# Patient Record
Sex: FEMALE | Race: WHITE | HISPANIC OR LATINO | Employment: UNEMPLOYED | URBAN - METROPOLITAN AREA
[De-identification: names, ages, dates, MRNs, and addresses within clinical notes are randomized per-mention and may not be internally consistent; named-entity substitution may affect disease eponyms.]

---
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
## 2021-11-29 ENCOUNTER — HOSPITAL ENCOUNTER (EMERGENCY)
Facility: HOSPITAL | Age: 21
Discharge: HOME | End: 2021-11-29
Attending: EMERGENCY MEDICINE

## 2021-11-29 VITALS
HEART RATE: 96 BPM | RESPIRATION RATE: 18 BRPM | OXYGEN SATURATION: 100 % | DIASTOLIC BLOOD PRESSURE: 71 MMHG | TEMPERATURE: 98 F | SYSTOLIC BLOOD PRESSURE: 107 MMHG

## 2021-11-29 DIAGNOSIS — R11.2 NON-INTRACTABLE VOMITING WITH NAUSEA, UNSPECIFIED VOMITING TYPE: Primary | ICD-10-CM

## 2021-11-29 DIAGNOSIS — R10.84 GENERALIZED ABDOMINAL PAIN: ICD-10-CM

## 2021-11-29 LAB
ALBUMIN SERPL BCP-MCNC: 4.5 G/DL (ref 3.5–5.2)
ALP SERPL-CCNC: 78 U/L (ref 55–135)
ALT SERPL W/O P-5'-P-CCNC: 31 U/L (ref 10–44)
ANION GAP SERPL CALC-SCNC: 12 MMOL/L (ref 8–16)
AST SERPL-CCNC: 24 U/L (ref 10–40)
B-HCG UR QL: NEGATIVE
BACTERIA #/AREA URNS HPF: ABNORMAL /HPF
BASOPHILS # BLD AUTO: 0.03 K/UL (ref 0–0.2)
BASOPHILS NFR BLD: 0.5 % (ref 0–1.9)
BILIRUB SERPL-MCNC: 0.3 MG/DL (ref 0.1–1)
BILIRUB UR QL STRIP: NEGATIVE
BUN SERPL-MCNC: 11 MG/DL (ref 6–20)
CALCIUM SERPL-MCNC: 9.2 MG/DL (ref 8.7–10.5)
CHLORIDE SERPL-SCNC: 104 MMOL/L (ref 95–110)
CLARITY UR: ABNORMAL
CO2 SERPL-SCNC: 24 MMOL/L (ref 23–29)
COLOR UR: ABNORMAL
CREAT SERPL-MCNC: 0.7 MG/DL (ref 0.5–1.4)
CTP QC/QA: YES
DIFFERENTIAL METHOD: ABNORMAL
EOSINOPHIL # BLD AUTO: 0 K/UL (ref 0–0.5)
EOSINOPHIL NFR BLD: 0.3 % (ref 0–8)
ERYTHROCYTE [DISTWIDTH] IN BLOOD BY AUTOMATED COUNT: 14.5 % (ref 11.5–14.5)
EST. GFR  (AFRICAN AMERICAN): >60 ML/MIN/1.73 M^2
EST. GFR  (NON AFRICAN AMERICAN): >60 ML/MIN/1.73 M^2
GLUCOSE SERPL-MCNC: 96 MG/DL (ref 70–110)
GLUCOSE UR QL STRIP: NEGATIVE
HCT VFR BLD AUTO: 40.3 % (ref 37–48.5)
HGB BLD-MCNC: 13.3 G/DL (ref 12–16)
HGB UR QL STRIP: ABNORMAL
HYALINE CASTS #/AREA URNS LPF: 0 /LPF
IMM GRANULOCYTES # BLD AUTO: 0.02 K/UL (ref 0–0.04)
IMM GRANULOCYTES NFR BLD AUTO: 0.3 % (ref 0–0.5)
KETONES UR QL STRIP: ABNORMAL
LEUKOCYTE ESTERASE UR QL STRIP: ABNORMAL
LIPASE SERPL-CCNC: 24 U/L (ref 4–60)
LYMPHOCYTES # BLD AUTO: 1.5 K/UL (ref 1–4.8)
LYMPHOCYTES NFR BLD: 24.5 % (ref 18–48)
MAGNESIUM SERPL-MCNC: 2 MG/DL (ref 1.6–2.6)
MCH RBC QN AUTO: 26 PG (ref 27–31)
MCHC RBC AUTO-ENTMCNC: 33 G/DL (ref 32–36)
MCV RBC AUTO: 79 FL (ref 82–98)
MICROSCOPIC COMMENT: ABNORMAL
MONOCYTES # BLD AUTO: 0.4 K/UL (ref 0.3–1)
MONOCYTES NFR BLD: 6.8 % (ref 4–15)
NEUTROPHILS # BLD AUTO: 4.1 K/UL (ref 1.8–7.7)
NEUTROPHILS NFR BLD: 67.6 % (ref 38–73)
NITRITE UR QL STRIP: NEGATIVE
NRBC BLD-RTO: 0 /100 WBC
PH UR STRIP: 7 [PH] (ref 5–8)
PLATELET # BLD AUTO: 272 K/UL (ref 150–450)
PMV BLD AUTO: 11.8 FL (ref 9.2–12.9)
POTASSIUM SERPL-SCNC: 3.9 MMOL/L (ref 3.5–5.1)
PROT SERPL-MCNC: 8 G/DL (ref 6–8.4)
PROT UR QL STRIP: ABNORMAL
RBC # BLD AUTO: 5.11 M/UL (ref 4–5.4)
RBC #/AREA URNS HPF: >100 /HPF (ref 0–4)
SODIUM SERPL-SCNC: 140 MMOL/L (ref 136–145)
SP GR UR STRIP: 1.02 (ref 1–1.03)
SQUAMOUS #/AREA URNS HPF: 3 /HPF
URN SPEC COLLECT METH UR: ABNORMAL
UROBILINOGEN UR STRIP-ACNC: NEGATIVE EU/DL
WBC # BLD AUTO: 6.05 K/UL (ref 3.9–12.7)
WBC #/AREA URNS HPF: 8 /HPF (ref 0–5)

## 2021-11-29 PROCEDURE — 81000 URINALYSIS NONAUTO W/SCOPE: CPT | Performed by: EMERGENCY MEDICINE

## 2021-11-29 PROCEDURE — 63600175 PHARM REV CODE 636 W HCPCS: Performed by: EMERGENCY MEDICINE

## 2021-11-29 PROCEDURE — 81025 URINE PREGNANCY TEST: CPT | Performed by: EMERGENCY MEDICINE

## 2021-11-29 PROCEDURE — 83735 ASSAY OF MAGNESIUM: CPT | Performed by: EMERGENCY MEDICINE

## 2021-11-29 PROCEDURE — 25000003 PHARM REV CODE 250: Performed by: EMERGENCY MEDICINE

## 2021-11-29 PROCEDURE — 96374 THER/PROPH/DIAG INJ IV PUSH: CPT

## 2021-11-29 PROCEDURE — 99285 EMERGENCY DEPT VISIT HI MDM: CPT | Mod: 25

## 2021-11-29 PROCEDURE — 80053 COMPREHEN METABOLIC PANEL: CPT | Performed by: EMERGENCY MEDICINE

## 2021-11-29 PROCEDURE — 96375 TX/PRO/DX INJ NEW DRUG ADDON: CPT

## 2021-11-29 PROCEDURE — 83690 ASSAY OF LIPASE: CPT | Performed by: EMERGENCY MEDICINE

## 2021-11-29 PROCEDURE — 96361 HYDRATE IV INFUSION ADD-ON: CPT

## 2021-11-29 PROCEDURE — 85025 COMPLETE CBC W/AUTO DIFF WBC: CPT | Performed by: EMERGENCY MEDICINE

## 2021-11-29 RX ORDER — METOCLOPRAMIDE 10 MG/1
10 TABLET ORAL EVERY 6 HOURS PRN
Qty: 14 TABLET | Refills: 0 | OUTPATIENT
Start: 2021-11-29 | End: 2023-10-02

## 2021-11-29 RX ORDER — DICYCLOMINE HYDROCHLORIDE 20 MG/1
20 TABLET ORAL 3 TIMES DAILY PRN
Qty: 14 TABLET | Refills: 0 | Status: SHIPPED | OUTPATIENT
Start: 2021-11-29 | End: 2021-12-29

## 2021-11-29 RX ORDER — KETOROLAC TROMETHAMINE 30 MG/ML
15 INJECTION, SOLUTION INTRAMUSCULAR; INTRAVENOUS
Status: COMPLETED | OUTPATIENT
Start: 2021-11-29 | End: 2021-11-29

## 2021-11-29 RX ORDER — METOCLOPRAMIDE HYDROCHLORIDE 5 MG/ML
10 INJECTION INTRAMUSCULAR; INTRAVENOUS
Status: COMPLETED | OUTPATIENT
Start: 2021-11-29 | End: 2021-11-29

## 2021-11-29 RX ADMIN — SODIUM CHLORIDE 1000 ML: 0.9 INJECTION, SOLUTION INTRAVENOUS at 11:11

## 2021-11-29 RX ADMIN — METOCLOPRAMIDE 10 MG: 5 INJECTION, SOLUTION INTRAMUSCULAR; INTRAVENOUS at 11:11

## 2021-11-29 RX ADMIN — LIDOCAINE HYDROCHLORIDE: 20 SOLUTION ORAL; TOPICAL at 12:11

## 2021-11-29 RX ADMIN — KETOROLAC TROMETHAMINE 15 MG: 30 INJECTION, SOLUTION INTRAMUSCULAR; INTRAVENOUS at 12:11

## 2023-02-15 ENCOUNTER — TELEPHONE (OUTPATIENT)
Dept: OBSTETRICS AND GYNECOLOGY | Facility: CLINIC | Age: 23
End: 2023-02-15
Payer: MEDICAID

## 2023-02-15 NOTE — TELEPHONE ENCOUNTER
----- Message from Yazan Morgan sent at 2/14/2023  1:10 PM CST -----  Contact: Pt.  .Type:  Needs Medical Advice    Who Called: pt  Would the patient rather a call back or a response via [facility]?  Call back  Best Call Back Number: [phone]  Additional Information:  Pt. Last menstrual was 11/21/2023 went to the women clinic was told she pregnant.  Pt would like to see Dr. Watson next appt was not until April.  Did not know if that was to farther out.   Please call the pt. back

## 2023-04-26 ENCOUNTER — TELEPHONE (OUTPATIENT)
Dept: OBSTETRICS AND GYNECOLOGY | Facility: CLINIC | Age: 23
End: 2023-04-26
Payer: MEDICAID

## 2023-04-26 NOTE — TELEPHONE ENCOUNTER
----- Message from Denise Benavides sent at 4/26/2023 10:34 AM CDT -----  Type:  pregnancy appointment     Who Called: pt  Symptoms (please be specific): needs call back to schedule appointment for pregnancy     Would the patient rather a call back or a response via [facility]? call  Best Call Back Number: [phone]  Additional Information:

## 2023-04-26 NOTE — TELEPHONE ENCOUNTER
----- Message from Yazan Morgan sent at 4/26/2023  2:34 PM CDT -----  Contact: pt  .Type:  Needs Medical Advice    Who Called: pt  Would the patient rather a call back or a response via [facility]?  Call back  Best Call Back Number: [phone]  Additional Information: pt. Is returning a call back to the office.

## 2023-10-02 ENCOUNTER — HOSPITAL ENCOUNTER (EMERGENCY)
Facility: HOSPITAL | Age: 23
Discharge: HOME | End: 2023-10-02
Attending: EMERGENCY MEDICINE
Payer: OTHER GOVERNMENT

## 2023-10-02 VITALS
HEART RATE: 80 BPM | WEIGHT: 135 LBS | OXYGEN SATURATION: 100 % | TEMPERATURE: 98 F | SYSTOLIC BLOOD PRESSURE: 132 MMHG | RESPIRATION RATE: 16 BRPM | DIASTOLIC BLOOD PRESSURE: 69 MMHG | BODY MASS INDEX: 26.37 KG/M2

## 2023-10-02 DIAGNOSIS — R10.84 GENERALIZED ABDOMINAL PAIN: ICD-10-CM

## 2023-10-02 DIAGNOSIS — K29.00 ACUTE SUPERFICIAL GASTRITIS WITHOUT HEMORRHAGE: ICD-10-CM

## 2023-10-02 DIAGNOSIS — R11.2 NAUSEA AND VOMITING, UNSPECIFIED VOMITING TYPE: Primary | ICD-10-CM

## 2023-10-02 LAB
ALBUMIN SERPL BCP-MCNC: 4.3 G/DL (ref 3.5–5.2)
ALP SERPL-CCNC: 84 U/L (ref 55–135)
ALT SERPL W/O P-5'-P-CCNC: 26 U/L (ref 10–44)
ANION GAP SERPL CALC-SCNC: 13 MMOL/L (ref 8–16)
AST SERPL-CCNC: 28 U/L (ref 10–40)
B-HCG UR QL: NEGATIVE
BACTERIA #/AREA URNS HPF: ABNORMAL /HPF
BASOPHILS # BLD AUTO: 0.03 K/UL (ref 0–0.2)
BASOPHILS NFR BLD: 0.3 % (ref 0–1.9)
BILIRUB SERPL-MCNC: 0.2 MG/DL (ref 0.1–1)
BILIRUB UR QL STRIP: NEGATIVE
BUN SERPL-MCNC: 17 MG/DL (ref 6–20)
CALCIUM SERPL-MCNC: 9.4 MG/DL (ref 8.7–10.5)
CHLORIDE SERPL-SCNC: 105 MMOL/L (ref 95–110)
CLARITY UR: CLEAR
CO2 SERPL-SCNC: 22 MMOL/L (ref 23–29)
COLOR UR: YELLOW
CREAT SERPL-MCNC: 0.8 MG/DL (ref 0.5–1.4)
CTP QC/QA: YES
DIFFERENTIAL METHOD: ABNORMAL
EOSINOPHIL # BLD AUTO: 0.1 K/UL (ref 0–0.5)
EOSINOPHIL NFR BLD: 1.4 % (ref 0–8)
ERYTHROCYTE [DISTWIDTH] IN BLOOD BY AUTOMATED COUNT: 14.4 % (ref 11.5–14.5)
EST. GFR  (NO RACE VARIABLE): >60 ML/MIN/1.73 M^2
GLUCOSE SERPL-MCNC: 89 MG/DL (ref 70–110)
GLUCOSE UR QL STRIP: NEGATIVE
HCT VFR BLD AUTO: 37.5 % (ref 37–48.5)
HGB BLD-MCNC: 12.1 G/DL (ref 12–16)
HGB UR QL STRIP: ABNORMAL
IMM GRANULOCYTES # BLD AUTO: 0.03 K/UL (ref 0–0.04)
IMM GRANULOCYTES NFR BLD AUTO: 0.3 % (ref 0–0.5)
KETONES UR QL STRIP: NEGATIVE
LEUKOCYTE ESTERASE UR QL STRIP: NEGATIVE
LIPASE SERPL-CCNC: 52 U/L (ref 4–60)
LYMPHOCYTES # BLD AUTO: 2.2 K/UL (ref 1–4.8)
LYMPHOCYTES NFR BLD: 21.1 % (ref 18–48)
MCH RBC QN AUTO: 26.5 PG (ref 27–31)
MCHC RBC AUTO-ENTMCNC: 32.3 G/DL (ref 32–36)
MCV RBC AUTO: 82 FL (ref 82–98)
MICROSCOPIC COMMENT: ABNORMAL
MONOCYTES # BLD AUTO: 0.5 K/UL (ref 0.3–1)
MONOCYTES NFR BLD: 5.1 % (ref 4–15)
NEUTROPHILS # BLD AUTO: 7.4 K/UL (ref 1.8–7.7)
NEUTROPHILS NFR BLD: 71.8 % (ref 38–73)
NITRITE UR QL STRIP: NEGATIVE
NRBC BLD-RTO: 0 /100 WBC
PH UR STRIP: 7 [PH] (ref 5–8)
PLATELET # BLD AUTO: 237 K/UL (ref 150–450)
PMV BLD AUTO: 12.7 FL (ref 9.2–12.9)
POTASSIUM SERPL-SCNC: 4.4 MMOL/L (ref 3.5–5.1)
PROT SERPL-MCNC: 8.1 G/DL (ref 6–8.4)
PROT UR QL STRIP: NEGATIVE
RBC # BLD AUTO: 4.56 M/UL (ref 4–5.4)
RBC #/AREA URNS HPF: 21 /HPF (ref 0–4)
SODIUM SERPL-SCNC: 140 MMOL/L (ref 136–145)
SP GR UR STRIP: 1.02 (ref 1–1.03)
SQUAMOUS #/AREA URNS HPF: 4 /HPF
URN SPEC COLLECT METH UR: ABNORMAL
UROBILINOGEN UR STRIP-ACNC: NEGATIVE EU/DL
WBC # BLD AUTO: 10.36 K/UL (ref 3.9–12.7)
WBC #/AREA URNS HPF: 3 /HPF (ref 0–5)

## 2023-10-02 PROCEDURE — 80053 COMPREHEN METABOLIC PANEL: CPT | Performed by: EMERGENCY MEDICINE

## 2023-10-02 PROCEDURE — 81000 URINALYSIS NONAUTO W/SCOPE: CPT | Performed by: EMERGENCY MEDICINE

## 2023-10-02 PROCEDURE — 96374 THER/PROPH/DIAG INJ IV PUSH: CPT

## 2023-10-02 PROCEDURE — 85025 COMPLETE CBC W/AUTO DIFF WBC: CPT | Performed by: EMERGENCY MEDICINE

## 2023-10-02 PROCEDURE — 96361 HYDRATE IV INFUSION ADD-ON: CPT

## 2023-10-02 PROCEDURE — 25000003 PHARM REV CODE 250: Performed by: EMERGENCY MEDICINE

## 2023-10-02 PROCEDURE — 83690 ASSAY OF LIPASE: CPT | Performed by: EMERGENCY MEDICINE

## 2023-10-02 PROCEDURE — 81025 URINE PREGNANCY TEST: CPT | Performed by: EMERGENCY MEDICINE

## 2023-10-02 PROCEDURE — 63600175 PHARM REV CODE 636 W HCPCS: Performed by: EMERGENCY MEDICINE

## 2023-10-02 PROCEDURE — 99284 EMERGENCY DEPT VISIT MOD MDM: CPT | Mod: 25

## 2023-10-02 RX ORDER — DICYCLOMINE HYDROCHLORIDE 10 MG/1
20 CAPSULE ORAL
Status: COMPLETED | OUTPATIENT
Start: 2023-10-02 | End: 2023-10-02

## 2023-10-02 RX ORDER — MAG HYDROX/ALUMINUM HYD/SIMETH 200-200-20
5 SUSPENSION, ORAL (FINAL DOSE FORM) ORAL
Status: COMPLETED | OUTPATIENT
Start: 2023-10-02 | End: 2023-10-02

## 2023-10-02 RX ORDER — ONDANSETRON 2 MG/ML
4 INJECTION INTRAMUSCULAR; INTRAVENOUS
Status: COMPLETED | OUTPATIENT
Start: 2023-10-02 | End: 2023-10-02

## 2023-10-02 RX ORDER — ONDANSETRON 4 MG/1
4 TABLET, FILM COATED ORAL EVERY 6 HOURS
Qty: 12 TABLET | Refills: 0 | Status: SHIPPED | OUTPATIENT
Start: 2023-10-02 | End: 2023-10-05

## 2023-10-02 RX ORDER — OMEPRAZOLE 20 MG/1
40 CAPSULE, DELAYED RELEASE ORAL DAILY
Qty: 30 CAPSULE | Refills: 1 | Status: SHIPPED | OUTPATIENT
Start: 2023-10-02 | End: 2023-12-21 | Stop reason: SDUPTHER

## 2023-10-02 RX ADMIN — SODIUM CHLORIDE 1000 ML: 9 INJECTION, SOLUTION INTRAVENOUS at 10:10

## 2023-10-02 RX ADMIN — DICYCLOMINE HYDROCHLORIDE 20 MG: 10 CAPSULE ORAL at 10:10

## 2023-10-02 RX ADMIN — ALUMINUM HYDROXIDE, MAGNESIUM HYDROXIDE, AND SIMETHICONE 5 ML: 200; 200; 20 SUSPENSION ORAL at 10:10

## 2023-10-02 RX ADMIN — ONDANSETRON 4 MG: 2 INJECTION INTRAMUSCULAR; INTRAVENOUS at 10:10

## 2023-10-03 NOTE — DISCHARGE INSTRUCTIONS
Thank you for choosing [facility]!     Our goal in the Emergency Department is to always provide outstanding medical care. You may receive a survey by mail or e-mail in the next week regarding your experience today. We would greatly appreciate you completing and returning the survey. Your feedback provides us with a way to recognize our staff who provide very good care, and it helps us learn how to improve when your experience was below our aspiration of excellence.      It is important to remember that some problems are difficult to diagnose and may not be found during your first visit. Be sure to follow up with your primary care doctor and review any labs/imaging that was performed during your visit with them. If you do not have a primary care doctor, you may contact the one listed on your discharge paperwork, or you may also call the [facility] Appointment Desk at [phone] to schedule an appointment.     All medications may potentially have side effects and it is impossible to predict which medications may give you side effects. If you feel that you are having a negative effect of any medication you should immediately stop taking them and seek medical attention.  Do not drive or make any important decisions for 24 hours if you have received any pain medications, sedatives or mood altering drugs during your ER visit.    We appreciate you trusting us with your medical care. We will be happy to take care of you for all of your future medical needs. You may return to the ER at any time for any new/concerning symptoms, worsening condition, or failure to improve. We hope you feel better soon.     Sincerely,    Boston Brown Jr., MD  Board-Certified Emergency Medicine Physician  [facility]

## 2023-10-03 NOTE — ED TRIAGE NOTES
Pt to ED 25 with c/o abdominal pain, emesis and nausea. Pt states she became ill before arrival after eating mexican food. Pt states she had multiple bouts of emesis. Upon arrival pt states she feels better. DEANA. AAOX4. Vbitals WNL. Pt resting on stretcher; family at bedside. Respirations even and non labored. Skin warm, dry, and intact.

## 2023-10-03 NOTE — ED PROVIDER NOTES
Emergency Department Encounter  Provider Note    Erendira Silva  [id number]  10/2/2023    Evaluation:    History Acquisition:     Chief Complaint   Patient presents with    Abdominal Pain     Patient states she has severe upper abd pain that started just prior to arrival after eating some mexican food. She had an episode of vomiting pta. She denies dizziness, diarrhea. She took aleve 3 hours ago for her period cramps.     Vomiting       History of Present Illness:  Erendira Silva is a 22 y.o. female who has no past medical history on file.    The patient presents to the ED due to epigastric abdominal pain, N/V.   Patient reports symptoms started after eating tortillas with beans.  She denies any history of similar symptoms. No prior abdominal surgery.   She states her symptoms resolved prior to arrival and currently has no pain. No other concerns.        #[id number] used for professional medical interpretation.       Additional historians utilized:  none    Prior medical records were reviewed:   [facility] admission for pregnancy 8/14  2. ED visit 11/2021 for abdominal pain, N/V    The patient's list of active medical problems, social history, medications, and allergies as documented has been reviewed.     No past medical history on file.  No past surgical history on file.  No family history on file.  Social History     Socioeconomic History    Marital status: Single   Tobacco Use    Smoking status: Never   Substance and Sexual Activity    Alcohol use: No     Review of patient's allergies indicates:  No Known Allergies    Review of Systems   Gastrointestinal:  Positive for abdominal pain, nausea and vomiting.         Physical Exam:     Initial Vitals [10/02/23 2137]   BP Pulse Resp Temp SpO2   125/71 82 20 98.6 °F (37 °C) 98 %      MAP       --         Physical Exam    Nursing note and vitals reviewed.  Constitutional: She appears well-developed and well-nourished. She is not diaphoretic. No distress.   HENT:    Head: Normocephalic and atraumatic.   Mouth/Throat: Oropharynx is clear and moist.   Eyes: EOM are normal. Pupils are equal, round, and reactive to light.   Neck: No tracheal deviation present.   Cardiovascular:  Normal rate, regular rhythm, normal heart sounds and intact distal pulses.           Pulmonary/Chest: Breath sounds normal. No stridor. No respiratory distress. She has no wheezes.   Abdominal: Abdomen is soft. Bowel sounds are normal. She exhibits no distension and no mass. There is no abdominal tenderness.   Musculoskeletal:         General: No edema. Normal range of motion.     Neurological: She is alert and oriented to person, place, and time. She has normal strength. No cranial nerve deficit or sensory deficit.   Skin: Skin is warm and dry. Capillary refill takes less than 2 seconds. No pallor.   Psychiatric: She has a normal mood and affect. Her behavior is normal. Thought content normal.         ED Course:   Procedures  Medical Decision Making:    Differential Diagnoses:   Based on available information and initial assessment, Differential Diagnosis includes, but is not limited to:  AAA, aortic dissection, mesenteric ischemia, perforated viscous, MI/ACS, SBO/volvulus, incarcerated/strangulated hernia, intussusception, ileus, appendicitis, cholecystitis, cholangitis, diverticulitis, esophagitis, hepatitis, nephrolithiasis, pancreatitis, gastroenteritis, colitis, IBD/IBS, biliary colic, GERD, PUD, constipation, UTI/pyelonephritis,  disorder.        EKG:       Labs:     Labs Reviewed   URINALYSIS, REFLEX TO URINE CULTURE - Abnormal; Notable for the following components:       Result Value    Occult Blood UA 2+ (*)     All other components within normal limits    Narrative:     Specimen Source->Urine   COMPREHENSIVE METABOLIC PANEL - Abnormal; Notable for the following components:    CO2 22 (*)     All other components within normal limits   URINALYSIS MICROSCOPIC - Abnormal; Notable for the  following components:    RBC, UA 21 (*)     All other components within normal limits    Narrative:     Specimen Source->Urine   CBC W/ AUTO DIFFERENTIAL - Abnormal; Notable for the following components:    MCH 26.5 (*)     All other components within normal limits   LIPASE   POCT URINE PREGNANCY     Independent review of the labs ordered include:   See ED course    Imaging:     Imaging Results    None            Medications Given:     Medications   sodium chloride 0.9% bolus 1,000 mL 1,000 mL (1,000 mLs Intravenous New Bag 10/2/23 2226)   ondansetron injection 4 mg (4 mg Intravenous Given 10/2/23 2226)   aluminum-magnesium hydroxide-simethicone 200-200-20 mg/5 mL suspension 5 mL (5 mLs Oral Given 10/2/23 2225)   dicyclomine capsule 20 mg (20 mg Oral Given 10/2/23 2226)        Additional Consideration:   Additional testing considered during clinical course: none    Social determinants of health considered during development of treatment plan include: poor access to care, language barrier    Current co-morbidities considered which impacted clinical decision making: inflammatory bowel disease    Case discussed with additional provider: none    ED Course as of 10/02/23 2324   Mon Oct 02, 2023   2318 SpO2: 98 % [SS]   2318 Resp: 20 [SS]   2318 Pulse: 82 [SS]   2318 Temp Source: Oral [SS]   2318 Temp: 98.6 °F (37 °C) [SS]   2318 BP: 125/71  Vitals WNL [SS]   2318 POCT urine pregnancy  Negative  [SS]   2318 Lipase  WNL [SS]   2318 CBC auto differential(!)  Unremarkable  [SS]   2318 Comprehensive metabolic panel(!)  Unremarkable  [SS]   2318 Urinalysis Microscopic(!) [SS]   2318 Urinalysis, Reflex to Urine Culture Urine, Clean Catch(!)  Inconsistent with UTI [SS]      ED Course User Index  [SS] Boston Brown MD            Medical Decision Making  21 yo F with epigastric pain after eating. Resolved prior to arrival.  Vitals and exam benign. Labs unremarkable.   Will DC with supportive care.     After complete evaluation,  including thorough history and physical exam, the patient's symptoms are most consistent with benign cause of abdominal pain, likely mild gastritis. There is no rebound/guarding or other peritoneal signs to suggest perforation or other emergent surgical process. There is no fever or leukocytosis to suggest acute bacterial infection. There is no significant focal abdominal tenderness to suggest cholecystitis, appendicitis, diverticulitis, or  source, and the patient's current symptoms and clinical presentation do not warrant other targeted diagnostics at this time. CT A/P is unlikely to outweigh risks of contrast/radiation at this time. The patient was treated with supportive care and improved. Will provide RX for omeprazole, Zofran upon D/C.      Problems Addressed:  Acute superficial gastritis without hemorrhage: acute illness or injury  Nausea and vomiting, unspecified vomiting type: acute illness or injury    Amount and/or Complexity of Data Reviewed  External Data Reviewed: notes.  Labs: ordered. Decision-making details documented in ED Course.    Risk  OTC drugs.  Prescription drug management.        Clinical Impression:       ICD-10-CM ICD-9-CM   1. Nausea and vomiting, unspecified vomiting type  R11.2 787.01   2. Acute superficial gastritis without hemorrhage  K29.00 535.40   3. Generalized abdominal pain  R10.84 789.07       Discharge Medications:  Current Discharge Medication List            Follow-up Information       Follow up With Specialties Details Why Contact Info    Jeff Hyde MD Family Medicine   [address] [facility]  [address]  [place] [address] [phone]               ED Disposition Condition    Discharge Stable              On re-evaluation, the patient's status has improved.  After complete ED evaluation, clinical impression is most consistent with gastritis.  PCP follow-up as soon as possible was recommended.    After taking into careful account the patient's history, physical exam  findings, as well as empirical and objective data obtained throughout ED workup, I feel no emergent medical condition has been identified. No further evaluation or admission was felt to be required, and the patient is stable for discharge from the ED. The patient and any additional family present were updated with test results, overall clinical impression, and recommended further plan of care, including discharge instructions as provided and outpatient follow-up for continued evaluation and management as needed. All questions were answered. The patient expressed understanding and agreed with current plan for discharge and follow-up plan of care. Strict ED return precautions were provided, including return/worsening of current symptoms, new symptoms, or any other concerns.       Boston Brown MD  10/02/23 0570

## 2023-12-21 ENCOUNTER — OFFICE VISIT (OUTPATIENT)
Dept: FAMILY MEDICINE | Facility: HOSPITAL | Age: 23
End: 2023-12-21
Payer: OTHER GOVERNMENT

## 2023-12-21 VITALS
DIASTOLIC BLOOD PRESSURE: 73 MMHG | WEIGHT: 127.63 LBS | HEART RATE: 72 BPM | BODY MASS INDEX: 25.06 KG/M2 | HEIGHT: 60 IN | SYSTOLIC BLOOD PRESSURE: 104 MMHG

## 2023-12-21 DIAGNOSIS — R53.83 FATIGUE, UNSPECIFIED TYPE: ICD-10-CM

## 2023-12-21 DIAGNOSIS — Z11.4 SCREENING FOR HIV (HUMAN IMMUNODEFICIENCY VIRUS): ICD-10-CM

## 2023-12-21 DIAGNOSIS — K21.9 GASTROESOPHAGEAL REFLUX DISEASE, UNSPECIFIED WHETHER ESOPHAGITIS PRESENT: ICD-10-CM

## 2023-12-21 DIAGNOSIS — Z13.6 ENCOUNTER FOR SCREENING FOR CARDIOVASCULAR DISORDERS: ICD-10-CM

## 2023-12-21 DIAGNOSIS — Z11.59 ENCOUNTER FOR HEPATITIS C SCREENING TEST FOR LOW RISK PATIENT: ICD-10-CM

## 2023-12-21 DIAGNOSIS — R10.84 GENERALIZED ABDOMINAL PAIN: ICD-10-CM

## 2023-12-21 DIAGNOSIS — R10.13 DYSPEPSIA: Primary | ICD-10-CM

## 2023-12-21 LAB
B-HCG UR QL: NEGATIVE
CTP QC/QA: YES

## 2023-12-21 PROCEDURE — 81025 URINE PREGNANCY TEST: CPT | Performed by: STUDENT IN AN ORGANIZED HEALTH CARE EDUCATION/TRAINING PROGRAM

## 2023-12-21 PROCEDURE — 99213 OFFICE O/P EST LOW 20 MIN: CPT | Performed by: STUDENT IN AN ORGANIZED HEALTH CARE EDUCATION/TRAINING PROGRAM

## 2023-12-21 RX ORDER — OMEPRAZOLE 20 MG/1
40 CAPSULE, DELAYED RELEASE ORAL DAILY
Qty: 30 CAPSULE | Refills: 1 | Status: SHIPPED | OUTPATIENT
Start: 2023-12-21 | End: 2024-01-02

## 2023-12-21 NOTE — PROGRESS NOTES
History & Physical  [facility]      SUBJECTIVE:     Erendira Silva is a 23 y.o. year old female with no significant PMHx who presents to to establish care:    CC: Heartburn    GERD:  - Reporting GERD symptoms associated w/ dyspepsia.  - Reports occasional associated abdominal pain as well.  - Symptoms worse with food intake.  - Symptoms have been present for the past 1-2 months.  - Evaluated at the ED and had a benign w/u, was discharged w/ omeprazole.  - Patient reports she never picked up the medication because she forgot.  - Has tried tums and baking soda with mild relief of symptoms.  - Denies h/o of previous symptoms, denies h/o of H pylori (however does report grandmother had the condition).    Fatigue:  - Chronic/ stable.  - Nonspecific.    Woman's health:  - .  - Sexually active with 1 male patient.  - Does not use barrier protection or any form of contraception.  - Reports not trying to get pregnant at this time.  - Reports is currently timing her cycles when engaging in intercourse.  - LMP: 12/10.  - At this time patient not amenable to beginning any form of contraception.      Patient Active Problem List    Diagnosis Date Noted    Diarrhea of presumed infectious origin 2017    Inflammatory bowel disease 2017    Generalized abdominal pain 2017        (Not in a hospital admission)    Review of patient's allergies indicates:  No Known Allergies     No past medical history on file.   No past surgical history on file.   No family history on file.   Social History     Tobacco Use    Smoking status: Never    Smokeless tobacco: Not on file   Substance Use Topics    Alcohol use: No      Review of Systems   Constitutional:        As per HPI, otherwise negative        OBJECTIVE:     Vitals:    23 1334   BP: 104/73   Pulse: 72   Weight: 57.9 kg (127 lb 10.3 oz)   Height: 5' (1.524 m)     Estimated body mass index is 24.93 kg/m² as calculated from the following:    Height  as of this encounter: 5' (1.524 m).    Weight as of this encounter: 57.9 kg (127 lb 10.3 oz).     Physical Exam  Constitutional:       Appearance: Normal appearance. She is normal weight.   HENT:      Head: Normocephalic and atraumatic.      Right Ear: External ear normal.      Left Ear: External ear normal.      Mouth/Throat:      Mouth: Mucous membranes are moist.      Pharynx: Oropharynx is clear.   Eyes:      Extraocular Movements: Extraocular movements intact.      Conjunctiva/sclera: Conjunctivae normal.      Pupils: Pupils are equal, round, and reactive to light.   Cardiovascular:      Rate and Rhythm: Normal rate and regular rhythm.      Pulses: Normal pulses.      Heart sounds: Normal heart sounds.   Pulmonary:      Effort: Pulmonary effort is normal.      Breath sounds: Normal breath sounds.   Abdominal:      General: Abdomen is flat. Bowel sounds are normal. There is no distension.      Tenderness: There is abdominal tenderness (Mild epigastric). There is no guarding.   Musculoskeletal:      Cervical back: Normal range of motion.      Right lower leg: No edema.      Left lower leg: No edema.   Skin:     Capillary Refill: Capillary refill takes less than 2 seconds.   Neurological:      Mental Status: She is alert and oriented to person, place, and time. Mental status is at baseline.   Psychiatric:         Mood and Affect: Mood normal.         Behavior: Behavior normal.         Thought Content: Thought content normal.         Labs:    Lab Results   Component Value Date    WBC 10.36 10/02/2023    HGB 12.1 10/02/2023    HCT 37.5 10/02/2023    MCV 82 10/02/2023     10/02/2023           CMP  Sodium   Date Value Ref Range Status   10/02/2023 140 136 - 145 mmol/L Final     Potassium   Date Value Ref Range Status   10/02/2023 4.4 3.5 - 5.1 mmol/L Final     Comment:     Specimen moderately hemolyzed     Chloride   Date Value Ref Range Status   10/02/2023 105 95 - 110 mmol/L Final     CO2   Date Value Ref  "Range Status   10/02/2023 22 (L) 23 - 29 mmol/L Final     Glucose   Date Value Ref Range Status   10/02/2023 89 70 - 110 mg/dL Final     BUN   Date Value Ref Range Status   10/02/2023 17 6 - 20 mg/dL Final     Creatinine   Date Value Ref Range Status   10/02/2023 0.8 0.5 - 1.4 mg/dL Final     Calcium   Date Value Ref Range Status   10/02/2023 9.4 8.7 - 10.5 mg/dL Final     Total Protein   Date Value Ref Range Status   10/02/2023 8.1 6.0 - 8.4 g/dL Final     Comment:     Specimen moderately hemolyzed     Albumin   Date Value Ref Range Status   10/02/2023 4.3 3.5 - 5.2 g/dL Final     Total Bilirubin   Date Value Ref Range Status   10/02/2023 0.2 0.1 - 1.0 mg/dL Final     Comment:     For infants and newborns, interpretation of results should be based  on gestational age, weight and in agreement with clinical  observations.    Premature Infant recommended reference ranges:  Up to 24 hours.............<8.0 mg/dL  Up to 48 hours............<12.0 mg/dL  3-5 days..................<15.0 mg/dL  6-29 days.................<15.0 mg/dL       Alkaline Phosphatase   Date Value Ref Range Status   10/02/2023 84 55 - 135 U/L Final     AST   Date Value Ref Range Status   10/02/2023 28 10 - 40 U/L Final     ALT   Date Value Ref Range Status   10/02/2023 26 10 - 44 U/L Final     Anion Gap   Date Value Ref Range Status   10/02/2023 13 8 - 16 mmol/L Final     eGFR   Date Value Ref Range Status   10/02/2023 >60 >60 mL/min/1.73 m^2 Final       No results found for: "TSH"    No results found for: "LABA1C", "HGBA1C"    The ASCVD Risk score (Crys DK, et al., 2019) failed to calculate for the following reasons:    The 2019 ASCVD risk score is only valid for ages 40 to 79    CrCl cannot be calculated (Patient's most recent lab result is older than the maximum 7 days allowed.).      ASSESSMENT/PLAN:     Dyspepsia  -     H. pylori antigen, stool; Future; Expected date: 12/21/2023  - Assessment: The patient presents with dyspepsia symptoms, which " have been ongoing for 1-2 months. These symptoms are worse with food intake and are associated with occasional abdominal pain. There is a family history of H. pylori infection, and the patient has not previously been tested for this condition. Mild epigastric tenderness was noted on physical examination. The ED evaluation did not reveal any significant findings, and the patient has been non-adherent with prescribed PPI therapy.   - Plan:   - The patient should submit a stool sample for H. pylori antigen testing to rule out this infection as the cause of her symptoms.   - The patient has been advised to  her prescribed omeprazole and take it daily after submitting the stool sample. This will help control the acidity in the stomach and may help alleviate her symptoms.   - Follow-up appointment should be scheduled to discuss the results of the H. pylori test and to assess the effectiveness of the PPI therapy and will start abx if indicated.     Gastroesophageal reflux disease, unspecified whether esophagitis present  - Assessment: The patient's reported symptoms of heartburn and dyspepsia, which worsen with food intake, are indicative of gastroesophageal reflux disease (GERD). The patient has not been adherent with previously prescribed PPI therapy, which is the first-line treatment for GERD. Symptoms may be related to H pylor, will investigate (see above).  - Plan:   - Advised to start taking prescribed omeprazole daily (after stool sample).  - Educated on lifestyle modifications, including dietary changes and weight management, which can help manage GERD symptoms.   - Follow-up appointment should be scheduled to monitor the patient's response to treatment and to adjust the treatment plan as necessary.     Generalized abdominal pain  -     omeprazole (PRILOSEC) 20 MG capsule; Take 2 capsules (40 mg total) by mouth once daily.  Dispense: 30 capsule; Refill: 1  -     POCT Urine Pregnancy  - Assessment: The  patient has occasional abdominal pain associated with her dyspepsia. This symptom is common in several gastrointestinal disorders, including GERD and H. pylori infection. The patients sexually active status and lack of contraception use necessitate a pregnancy test to rule out pregnancy-related abdominal pain. UPT negative.  - Plan:   - As above    Fatigue, unspecified type  -     TSH; Future; Expected date: 12/21/2023  - Assessment: The patient reports chronic, nonspecific fatigue. Recent blood counts and electrolytes were within normal limits. As fatigue can be a symptom of several conditions, including thyroid disorders, a TSH test has been ordered to evaluate thyroid function.   - Plan:   - The TSH test will help determine if the patient's fatigue is due to a thyroid disorder.   - Depending on the TSH results, additional evaluation and treatment may be necessary.   - The patient should be advised to maintain good sleep hygiene and engage in regular physical activity, which can help manage fatigue.     Screening for HIV (human immunodeficiency virus)  -     HIV 1/2 Ag/Ab (4th Gen); Future; Expected date: 12/21/2023    Encounter for hepatitis C screening test for low risk patient  -     Hepatitis C Antibody; Future; Expected date: 12/21/2023    Encounter for screening for cardiovascular disorders  -     Lipid Panel; Future; Expected date: 12/21/2023       Patient instructed to receive or provide proof of all deficient vaccines in chart - patient verbalized understanding      In regards to A&P, patient verbalized understanding and agreeable to plan      Follow up: 1 month GERD/dyspepsia f/u, HCM      Case discussed with staff: Dr. Massey      This note was partially created using [employer] Voice Recognition software. Typographical and content errors may occur with this process. While efforts are made to detect and correct such errors, in some cases errors will persist. For this reason, wording in this document  should be considered in the proper context and not strictly verbatim.    The following information is provided to all patients.  This information is to help you find resources for any of the problems found today that may be affecting your health:                Living healthy guide: www.[facility].[place].[facility]       Understanding Diabetes: www.diabetes.org       Eating healthy: www.cdc.gov/healthyweight      CDC home safety checklist: www.cdc.gov/steadi/patient.html      Agency on Aging: www.goea.[place].[facility]       Alcoholics anonymous (AA): www.aa.org      Physical Activity: www.tho.nih.gov/vc4idrh       Tobacco use: www.quitwithusla.org       Hank Juarez MD  [facility], PGY-3  12/21/2023

## 2023-12-22 ENCOUNTER — LAB VISIT (OUTPATIENT)
Dept: LAB | Facility: HOSPITAL | Age: 23
End: 2023-12-22
Attending: STUDENT IN AN ORGANIZED HEALTH CARE EDUCATION/TRAINING PROGRAM
Payer: OTHER GOVERNMENT

## 2023-12-22 DIAGNOSIS — R10.13 DYSPEPSIA: ICD-10-CM

## 2023-12-22 PROBLEM — K21.9 GASTROESOPHAGEAL REFLUX DISEASE: Status: ACTIVE | Noted: 2023-12-22

## 2023-12-22 PROCEDURE — 87338 HPYLORI STOOL AG IA: CPT | Performed by: STUDENT IN AN ORGANIZED HEALTH CARE EDUCATION/TRAINING PROGRAM

## 2023-12-22 NOTE — PROGRESS NOTES
I assume primary medical responsibility for this patient. I have reviewed the history, physical, and assessement & treatment plan with the resident and agree that the care is reasonable and necessary. This service has been performed by a resident with the presence of a teaching physician under the primary care exception. If necessary, an addendum of additional findings or evaluation beyond the resident documentation will be noted below.    Patient evaluated by writer and not determined to be an acute issue. Benign exam. Preg test negative and birth control reviewed which should be addressed at next visit as sexually active not wishing to get pregnant.

## 2023-12-29 LAB — H PYLORI AG STL QL IA: DETECTED

## 2024-01-02 ENCOUNTER — TELEPHONE (OUTPATIENT)
Dept: FAMILY MEDICINE | Facility: HOSPITAL | Age: 24
End: 2024-01-02
Payer: OTHER GOVERNMENT

## 2024-01-02 DIAGNOSIS — R10.84 GENERALIZED ABDOMINAL PAIN: ICD-10-CM

## 2024-01-02 DIAGNOSIS — A04.8 H. PYLORI INFECTION: Primary | ICD-10-CM

## 2024-01-02 RX ORDER — OMEPRAZOLE 20 MG/1
20 CAPSULE, DELAYED RELEASE ORAL 2 TIMES DAILY
Qty: 30 CAPSULE | Refills: 1 | Status: SHIPPED | OUTPATIENT
Start: 2024-01-02 | End: 2024-01-02

## 2024-01-02 RX ORDER — METRONIDAZOLE 500 MG/1
500 TABLET ORAL 4 TIMES DAILY
Qty: 56 TABLET | Refills: 0 | Status: SHIPPED | OUTPATIENT
Start: 2024-01-02 | End: 2024-01-16

## 2024-01-02 RX ORDER — OMEPRAZOLE 20 MG/1
20 CAPSULE, DELAYED RELEASE ORAL 2 TIMES DAILY
Qty: 30 CAPSULE | Refills: 1 | Status: SHIPPED | OUTPATIENT
Start: 2024-01-02 | End: 2024-02-01

## 2024-01-02 RX ORDER — TETRACYCLINE HYDROCHLORIDE 500 MG/1
500 CAPSULE ORAL 4 TIMES DAILY
Qty: 56 CAPSULE | Refills: 0 | Status: SHIPPED | OUTPATIENT
Start: 2024-01-02 | End: 2024-01-16

## 2024-01-02 NOTE — TELEPHONE ENCOUNTER
Called and discussed lab results with patient. Pt pos for H. Pylori. Reports symptomatic improvement after starting PPI Tx. Will add the rest of regimen for Quadruple therapy. I answered and addressed all questions and concerns. Advised follow up in 2-4 weeks. Of not patient is not currently breast feeding. Advised her to not breastfeed during the Tx period.    Hank Juarez MD  [facility], PGY-3  01/02/2024

## 2024-01-19 ENCOUNTER — OFFICE VISIT (OUTPATIENT)
Dept: FAMILY MEDICINE | Facility: HOSPITAL | Age: 24
End: 2024-01-19
Payer: OTHER GOVERNMENT

## 2024-01-19 VITALS
BODY MASS INDEX: 24.28 KG/M2 | HEIGHT: 60 IN | HEART RATE: 88 BPM | TEMPERATURE: 99 F | SYSTOLIC BLOOD PRESSURE: 94 MMHG | WEIGHT: 123.69 LBS | OXYGEN SATURATION: 98 % | DIASTOLIC BLOOD PRESSURE: 69 MMHG

## 2024-01-19 DIAGNOSIS — R11.2 NAUSEA AND VOMITING, UNSPECIFIED VOMITING TYPE: ICD-10-CM

## 2024-01-19 DIAGNOSIS — Z13.6 ENCOUNTER FOR SCREENING FOR CARDIOVASCULAR DISORDERS: ICD-10-CM

## 2024-01-19 DIAGNOSIS — Z11.4 SCREENING FOR HIV (HUMAN IMMUNODEFICIENCY VIRUS): ICD-10-CM

## 2024-01-19 DIAGNOSIS — R10.13 EPIGASTRIC PAIN: ICD-10-CM

## 2024-01-19 DIAGNOSIS — R53.83 FATIGUE, UNSPECIFIED TYPE: ICD-10-CM

## 2024-01-19 DIAGNOSIS — A04.8 H. PYLORI INFECTION: Primary | ICD-10-CM

## 2024-01-19 DIAGNOSIS — K21.9 GASTROESOPHAGEAL REFLUX DISEASE, UNSPECIFIED WHETHER ESOPHAGITIS PRESENT: ICD-10-CM

## 2024-01-19 DIAGNOSIS — F41.9 ANXIETY: ICD-10-CM

## 2024-01-19 DIAGNOSIS — Z11.59 ENCOUNTER FOR HEPATITIS C SCREENING TEST FOR LOW RISK PATIENT: ICD-10-CM

## 2024-01-19 PROCEDURE — 99214 OFFICE O/P EST MOD 30 MIN: CPT | Performed by: STUDENT IN AN ORGANIZED HEALTH CARE EDUCATION/TRAINING PROGRAM

## 2024-01-19 RX ORDER — ONDANSETRON 4 MG/1
8 TABLET, ORALLY DISINTEGRATING ORAL 2 TIMES DAILY
Qty: 20 TABLET | Refills: 1 | Status: SHIPPED | OUTPATIENT
Start: 2024-01-19

## 2024-01-19 RX ORDER — ALUMINUM HYDROXIDE, MAGNESIUM HYDROXIDE, AND SIMETHICONE 2400; 240; 2400 MG/30ML; MG/30ML; MG/30ML
10 SUSPENSION ORAL EVERY 6 HOURS PRN
Qty: 355 ML | Refills: 1 | Status: SHIPPED | OUTPATIENT
Start: 2024-01-19 | End: 2025-01-18

## 2024-01-22 PROBLEM — A04.8 H. PYLORI INFECTION: Status: ACTIVE | Noted: 2024-01-22

## 2024-01-22 PROBLEM — F41.9 ANXIETY: Status: ACTIVE | Noted: 2024-01-22

## 2024-01-22 NOTE — PROGRESS NOTES
Progress Note  [facility]    Subjective:     Erendira Silva is a 23 y.o. year old female with recent PMHx of H. Pylori who presents to clinic for:    CC:  H pylori re-evaluation, anxiety, fatigue    H pylori:  - Recent diagnosis of H pylori through stool antigen testing.   - She was started on quadruple therapy and reports adherence to medical regimen.  - Patient reporting good days and bad days regarding her symptoms, but overall symptoms have improved with medical management.  - Reporting GERD symptoms associated w/ dyspepsia.  - Reports occasional associated epigastric abdominal pain as well.  - Patient also reporting that she sometimes will vomit, but denies recent episodes.  - Symptoms worse with food intake, especially when she eats heavy/greasy foods.  - Symptoms have been present for the past 2-3 months.  - Patient reporting that she continues to take medication, however on chart review it appears to be past 14 days of the course.  - Of note, patient was having medication noncompliance issues in the past potentially 2/2 to a language barrier.  - However, speaking patient's native language confirm that she understands the disease in the required medical management.  - Denies h/o of previous symptoms, denies h/o of H pylori (however does report grandmother had the condition).    Anxiety:   - Mild/moderate anxiety symptoms as evidenced by WONG-7.   - Associated with mild depressive symptoms as evidenced by PHQ-9.  - Patient did not get recommended lab work ordered on last encounter, reporting that she did not know she had lab work to do.  - Verbalized understanding of importance of getting recommended lab work to address potential metabolic cause of associated symptoms.    Fatigue:  - Chronic/ stable.  - Nonspecific.    Woman's health:  - .  - Sexually active with 1 male patient.  - Does not use barrier protection or any form of contraception.  - Reports not trying to get pregnant at this  time.  - Reports is currently timing her cycles when engaging in intercourse.  - LMP: 12/10.  - At this time patient not amenable to beginning any form of contraception.        Patient Active Problem List    Diagnosis Date Noted    Gastroesophageal reflux disease 12/22/2023    Dyspepsia 12/22/2023    Diarrhea of presumed infectious origin 02/06/2017    Inflammatory bowel disease 02/06/2017    Generalized abdominal pain 02/03/2017        (Not in a hospital admission)    Review of patient's allergies indicates:  No Known Allergies     No past medical history on file.   No past surgical history on file.   No family history on file.   Social History     Tobacco Use    Smoking status: Never    Smokeless tobacco: Not on file   Substance Use Topics    Alcohol use: No      Review of Systems   Constitutional:        As per HPI, otherwise negative        Objective:     Vitals:    01/19/24 1007   BP: 94/69   Pulse: 88   Temp: 99 °F (37.2 °C)   TempSrc: Oral   SpO2: 98%   Weight: 56.1 kg (123 lb 10.9 oz)   Height: 5' (1.524 m)     Estimated body mass index is 24.15 kg/m² as calculated from the following:    Height as of this encounter: 5' (1.524 m).    Weight as of this encounter: 56.1 kg (123 lb 10.9 oz).     Physical Exam  Constitutional:       Appearance: Normal appearance. She is normal weight.   HENT:      Head: Normocephalic and atraumatic.      Right Ear: External ear normal.      Left Ear: External ear normal.      Mouth/Throat:      Mouth: Mucous membranes are moist.      Pharynx: Oropharynx is clear.   Eyes:      Extraocular Movements: Extraocular movements intact.      Conjunctiva/sclera: Conjunctivae normal.      Pupils: Pupils are equal, round, and reactive to light.   Cardiovascular:      Rate and Rhythm: Normal rate and regular rhythm.      Pulses: Normal pulses.      Heart sounds: Normal heart sounds.   Pulmonary:      Effort: Pulmonary effort is normal.      Breath sounds: Normal breath sounds.   Abdominal:       General: Abdomen is flat. Bowel sounds are normal. There is no distension.      Tenderness: There is abdominal tenderness (Mild epigastric). There is no guarding.   Musculoskeletal:      Cervical back: Normal range of motion.      Right lower leg: No edema.      Left lower leg: No edema.   Skin:     Capillary Refill: Capillary refill takes less than 2 seconds.   Neurological:      Mental Status: She is alert and oriented to person, place, and time. Mental status is at baseline.   Psychiatric:         Mood and Affect: Mood normal.         Behavior: Behavior normal.         Thought Content: Thought content normal.         Assessment/Plan:     - H. pylori infection:      - Encounter orders: H. pylori antigen, stool; Future; Expected date: 01/19/2024      - Assessment: The patient's recent diagnosis of H. pylori infection is supported by her clinical history of dyspepsia, occasional vomiting, and epigastric abdominal pain, particularly after the intake of heavy/greasy foods. Her symptoms improvement following quadruple therapy further supports this diagnosis. However, her medication adherence is questionable given the discrepancy between her report and the chart review, which might affect the outcome of the therapy.      - Plan:          - Encourage the patient to adhere strictly to the medication regimen.          - Repeat stool antigen test once she finishes the therapy to evaluate the effectiveness of the treatment.          - Educate the patient about the importance of completing the full course of antibiotics to prevent antibiotic resistance and recurrence of the infection.    - Epigastric pain:      - Encounter orders: Ambulatory referral/consult to Gastroenterology; Future; Expected date: 01/26/2024      - Assessment: The patient's epigastric pain may be associated with her H. pylori infection, as this bacterium is a common cause of gastritis and peptic ulcer disease, both of which can present with epigastric  pain. Alternatively, the pain may be due to GERD, another condition the patient is experiencing.      - Plan:          - Refer the patient to a gastroenterologist for further evaluation and management.          - Continue PPI for management, as PPIs are effective in reducing gastric acid secretion.    - Nausea and vomiting, unspecified vomiting type:      - Encounter orders: ondansetron (ZOFRAN-ODT) 4 MG TbDL; Take 2 tablets (8 mg total) by mouth 2 (two) times daily.  Dispense: 20 tablet; Refill: 1      - Assessment: The patient's nausea and occasional vomiting could be secondary to her H. pylori infection. However, it's crucial to rule out other potential causes such as gastrointestinal disorders or metabolic abnormalities.      - Plan:          - Administer ondansetron to control the symptoms of nausea and vomiting.          - Monitor the patient's response to the medication and adjust the dosage as necessary.          - Will f/u GI recs.    - Gastroesophageal reflux disease, unspecified whether esophagitis present:      - Encounter orders: aluminum & magnesium hydroxide-simethicone (MAALOX MAXIMUM STRENGTH) 400-400-40 mg/5 mL suspension; Take 10 mLs by mouth every 6 (six) hours as needed for Indigestion.  Dispense: 355 mL; Refill: 1      - Assessment: The patient's report of GERD symptoms associated with dyspepsia supports the diagnosis of GERD. GERD is a chronic condition that can be managed with lifestyle modifications and medications.      - Plan:          - Advise the patient to avoid triggers such as heavy/greasy foods, caffeine, and alcohol.          - Begin prescribed antacid therapy and monitor her response.          - Consider PPIs if the symptoms persist despite the antacid use.    - Anxiety:      - Assessment: The patient's WONG-7 score indicates mild to moderate anxiety. The patient's anxiety may be contributing to her gastrointestinal symptoms and overall sense of fatigue. It's also important to  address her anxiety as it could potentially affect her adherence to medical treatments.      - Plan:          - Consider cognitive-behavioral therapy or other forms of psychotherapy.          - Monitor the patient's anxiety levels regularly and consider pharmacological intervention if necessary.    - Fatigue, unspecified type:      - Encounter orders: TSH; Future; Expected date: 01/19/2024, CBC Auto Differential; Future; Expected date: 01/19/2024      - Assessment: The patient's fatigue is nonspecific and chronic. It could be associated with her H. pylori infection, anxiety, or an underlying thyroid or hematologic disorder.      - Plan:          - Order a TSH test and CBC to rule out thyroid dysfunction and anemia, respectively, as potential causes of fatigue.          - Monitor the patient's fatigue levels and consider further evaluation if the symptom persists despite the management of her other conditions.    - Screening for HIV (human immunodeficiency virus):      - Encounter orders: HIV 1/2 Ag/Ab (4th Gen); Future; Expected date: 01/19/2024      - Assessment: The patient is sexually active and does not use barrier protection, which puts her at risk for sexually transmitted infections, including HIV. At this time she no amenable to any forms of contraception.      - Plan:          - Proceed with HIV screening as planned.          - Educate the patient about the importance of safe sex practices to prevent STIs.    - Encounter for hepatitis C screening test for low risk patient:      - Encounter orders: Hepatitis C Antibody; Future; Expected date: 01/19/2024      - Assessment: The patient does not have known risk factors for hepatitis C but she does not use barrier protection during intercourse. Thus, screening is warranted.      - Plan:          - Proceed with Hepatitis C antibody screening as planned.          - Discuss the results with the patient once they are available.    - Encounter for screening for  cardiovascular disorders:      - Encounter orders: Lipid Panel; Future; Expected date: 01/19/2024      - Assessment: The patient does not present with any immediate cardiovascular symptoms, however, considering her age and potential risk factors, screening for cardiovascular disorders is appropriate.      - Plan:          - Proceed with the lipid panel as ordered.          - Discuss the results with the patient and advise on lifestyle modifications if necessary.         Patient instructed to receive or provide proof of all deficient vaccines in chart - patient verbalized understanding      In regards to A&P, patient verbalized understanding and agreeable to plan      Follow-up: 2 weeks re-evaluation, H pylori stool results      Case discussed with staff: Dr. Massey      This note was partially created using Differential Voice Recognition software. Typographical and content errors may occur with this process. While efforts are made to detect and correct such errors, in some cases errors will persist. For this reason, wording in this document should be considered in the proper context and not strictly verbatim.    The following information is provided to all patients.  This information is to help you find resources for any of the problems found today that may be affecting your health:                Living healthy guide: www.[facility].[place].gov       Understanding Diabetes: www.diabetes.org       Eating healthy: www.cdc.gov/healthyweight      CDC home safety checklist: www.cdc.gov/steadi/patient.html      Agency on Aging: www.goea.[place].gov       Alcoholics anonymous (AA): www.aa.org      Physical Activity: www.tho.nih.gov/ai6odsi       Tobacco use: www.quitwithusla.org        Hank Juarez MD  [facility], PGY-3  01/22/2024

## 2024-01-23 PROBLEM — R53.83 FATIGUE: Status: ACTIVE | Noted: 2024-01-23

## 2024-01-23 PROBLEM — R11.2 NAUSEA AND VOMITING: Status: ACTIVE | Noted: 2024-01-23

## 2024-01-23 NOTE — PROGRESS NOTES
I assume primary medical responsibility for this patient. I have reviewed the history, physical, and assessement & treatment plan with the resident and agree that the care is reasonable and necessary. This service has been performed by a resident without the presence of a teaching physician under the primary care exception. If necessary, an addendum of additional findings or evaluation beyond the resident documentation will be noted below.    Agree with further workup with Test of cure for H. Pylori in particular for chronic epigastric pain with referral to GI pending results of workup.